# Patient Record
Sex: MALE | Race: WHITE | Employment: PART TIME | URBAN - NONMETROPOLITAN AREA
[De-identification: names, ages, dates, MRNs, and addresses within clinical notes are randomized per-mention and may not be internally consistent; named-entity substitution may affect disease eponyms.]

---

## 2018-08-07 ENCOUNTER — OFFICE VISIT (OUTPATIENT)
Dept: OPTOMETRY | Age: 53
End: 2018-08-07
Payer: COMMERCIAL

## 2018-08-07 DIAGNOSIS — H53.8 BLURRED VISION, BILATERAL: Primary | ICD-10-CM

## 2018-08-07 DIAGNOSIS — H52.203 MYOPIA OF BOTH EYES WITH ASTIGMATISM AND PRESBYOPIA: ICD-10-CM

## 2018-08-07 DIAGNOSIS — H52.13 MYOPIA OF BOTH EYES WITH ASTIGMATISM AND PRESBYOPIA: ICD-10-CM

## 2018-08-07 DIAGNOSIS — H52.4 MYOPIA OF BOTH EYES WITH ASTIGMATISM AND PRESBYOPIA: ICD-10-CM

## 2018-08-07 DIAGNOSIS — H25.13 NUCLEAR SCLEROSIS OF BOTH EYES: ICD-10-CM

## 2018-08-07 PROCEDURE — 1036F TOBACCO NON-USER: CPT | Performed by: OPTOMETRIST

## 2018-08-07 PROCEDURE — G8427 DOCREV CUR MEDS BY ELIG CLIN: HCPCS | Performed by: OPTOMETRIST

## 2018-08-07 PROCEDURE — G8421 BMI NOT CALCULATED: HCPCS | Performed by: OPTOMETRIST

## 2018-08-07 PROCEDURE — 3017F COLORECTAL CA SCREEN DOC REV: CPT | Performed by: OPTOMETRIST

## 2018-08-07 PROCEDURE — 99203 OFFICE O/P NEW LOW 30 MIN: CPT | Performed by: OPTOMETRIST

## 2018-08-07 ASSESSMENT — KERATOMETRY
OS_AXISANGLE_DEGREES: 083
OD_K1POWER_DIOPTERS: 47.00
OS_AXISANGLE2_DEGREES: 173
OD_K2POWER_DIOPTERS: 48.75
OD_AXISANGLE_DEGREES: 102
OD_AXISANGLE2_DEGREES: 012
OS_K2POWER_DIOPTERS: 48.50
OS_K1POWER_DIOPTERS: 47.25

## 2018-08-07 ASSESSMENT — REFRACTION_WEARINGRX
SPECS_TYPE: SVL
OS_CYLINDER: -1.25
OS_AXIS: 179
OD_SPHERE: -3.25
OD_CYLINDER: -1.00
OD_AXIS: 010
OS_SPHERE: -4.28

## 2018-08-07 ASSESSMENT — REFRACTION_MANIFEST
OD_AXIS: 010
OS_ADD: +2.25
OD_CYLINDER: -0.75
OD_SPHERE: -3.25
OS_SPHERE: -4.00
OS_CYLINDER: -0.75
OD_ADD: +2.25
OS_AXIS: 006

## 2018-08-07 ASSESSMENT — VISUAL ACUITY
CORRECTION_TYPE: GLASSES
OD_CC: 20/25 OU
OS_CC: 20/20
OD_CC+: -1
METHOD: SNELLEN - LINEAR

## 2018-08-07 ASSESSMENT — SLIT LAMP EXAM - LIDS
COMMENTS: NORMAL
COMMENTS: NORMAL

## 2018-08-07 ASSESSMENT — TONOMETRY
OD_IOP_MMHG: 19
OS_IOP_MMHG: 17
IOP_METHOD: NON-CONTACT AIR PUFF

## 2018-08-07 NOTE — PROGRESS NOTES
Final Rx       Sphere Cylinder Axis Add    Right -3.25 -0.75 010 +2.25    Left -4.00 -0.75 006 +2.25    Type:  SVL    Expiration Date:  8/7/2020            1. Blurred vision, bilateral    2. Nuclear sclerosis of both eyes    3.  Myopia of both eyes with astigmatism and presbyopia           Patient Instructions   New glasses recommended      Return in about 2 years (around 8/7/2020) for complete eye exam.

## 2021-01-28 ENCOUNTER — OFFICE VISIT (OUTPATIENT)
Dept: OPTOMETRY | Age: 56
End: 2021-01-28
Payer: MEDICARE

## 2021-01-28 DIAGNOSIS — H35.033 HYPERTENSIVE RETINOPATHY OF BOTH EYES: ICD-10-CM

## 2021-01-28 DIAGNOSIS — H52.203 MYOPIA OF BOTH EYES WITH ASTIGMATISM AND PRESBYOPIA: ICD-10-CM

## 2021-01-28 DIAGNOSIS — H53.8 BLURRED VISION, BILATERAL: Primary | ICD-10-CM

## 2021-01-28 DIAGNOSIS — H52.13 MYOPIA OF BOTH EYES WITH ASTIGMATISM AND PRESBYOPIA: ICD-10-CM

## 2021-01-28 DIAGNOSIS — H52.4 MYOPIA OF BOTH EYES WITH ASTIGMATISM AND PRESBYOPIA: ICD-10-CM

## 2021-01-28 PROCEDURE — 3017F COLORECTAL CA SCREEN DOC REV: CPT | Performed by: OPTOMETRIST

## 2021-01-28 PROCEDURE — 92250 FUNDUS PHOTOGRAPHY W/I&R: CPT | Performed by: OPTOMETRIST

## 2021-01-28 PROCEDURE — 99211 OFF/OP EST MAY X REQ PHY/QHP: CPT

## 2021-01-28 PROCEDURE — G8421 BMI NOT CALCULATED: HCPCS | Performed by: OPTOMETRIST

## 2021-01-28 PROCEDURE — 1036F TOBACCO NON-USER: CPT | Performed by: OPTOMETRIST

## 2021-01-28 PROCEDURE — 99214 OFFICE O/P EST MOD 30 MIN: CPT | Performed by: OPTOMETRIST

## 2021-01-28 PROCEDURE — G8484 FLU IMMUNIZE NO ADMIN: HCPCS | Performed by: OPTOMETRIST

## 2021-01-28 PROCEDURE — G8427 DOCREV CUR MEDS BY ELIG CLIN: HCPCS | Performed by: OPTOMETRIST

## 2021-01-28 ASSESSMENT — REFRACTION_MANIFEST
OD_SPHERE: -2.75
OD_AXIS: 010
OS_ADD: +2.25
OS_CYLINDER: -0.75
OD_ADD: +2.25
OS_SPHERE: -3.50
OS_AXIS: 010
OD_CYLINDER: -0.50

## 2021-01-28 ASSESSMENT — ENCOUNTER SYMPTOMS
ALLERGIC/IMMUNOLOGIC NEGATIVE: 0
EYES NEGATIVE: 0
RESPIRATORY NEGATIVE: 1
GASTROINTESTINAL NEGATIVE: 0

## 2021-01-28 ASSESSMENT — TONOMETRY
OD_IOP_MMHG: 17
IOP_METHOD: NON-CONTACT AIR PUFF
OS_IOP_MMHG: 15

## 2021-01-28 ASSESSMENT — REFRACTION_WEARINGRX
OS_CYLINDER: -0.75
OS_SPHERE: -4.00
OD_SPHERE: -3.25
OD_AXIS: 010
OS_ADD: +2.25
OD_ADD: +2.25
OS_AXIS: 006
OD_CYLINDER: -0.75

## 2021-01-28 ASSESSMENT — SLIT LAMP EXAM - LIDS
COMMENTS: NORMAL
COMMENTS: NORMAL

## 2021-01-28 ASSESSMENT — VISUAL ACUITY
OS_CC: 20/60
CORRECTION_TYPE: GLASSES
METHOD: SNELLEN - LINEAR
OD_SC: 20/20 OU

## 2021-01-28 NOTE — PROGRESS NOTES
Milla Thakur presents today for   Chief Complaint   Patient presents with    Blurred Vision    Vision Exam   .    HPI     Blurred Vision     In both eyes. Vision is blurred. Context:  distance vision and reading. Comments     Last Vision Exam: 8/7/2018 Aw  Last Ophthalmology Exam: n/a  Last Filled Glasses Rx: 8/7/2018  Insurance: Medicare  Update: Glasses  Distance and reading are getting more blurry                 Current Outpatient Medications   Medication Sig Dispense Refill    acetaminophen (TYLENOL) 325 MG tablet Take 650 mg by mouth as needed for Pain.  Fexofenadine HCl (ALLEGRA PO) Take 1 tablet by mouth as needed. No current facility-administered medications for this visit.           Family History   Problem Relation Age of Onset    Glaucoma Neg Hx     Diabetes Neg Hx     Cataracts Neg Hx      Social History     Socioeconomic History    Marital status: Single     Spouse name: None    Number of children: None    Years of education: None    Highest education level: None   Occupational History    None   Social Needs    Financial resource strain: None    Food insecurity     Worry: None     Inability: None    Transportation needs     Medical: None     Non-medical: None   Tobacco Use    Smoking status: Never Smoker    Smokeless tobacco: Never Used   Substance and Sexual Activity    Alcohol use: None    Drug use: None    Sexual activity: None   Lifestyle    Physical activity     Days per week: None     Minutes per session: None    Stress: None   Relationships    Social connections     Talks on phone: None     Gets together: None     Attends Nondenominational service: None     Active member of club or organization: None     Attends meetings of clubs or organizations: None     Relationship status: None    Intimate partner violence     Fear of current or ex partner: None     Emotionally abused: None     Physically abused: None     Forced sexual activity: None Other Topics Concern    None   Social History Narrative    None     History reviewed. No pertinent past medical history.     ROS     Positive for: Cardiovascular, Respiratory    Negative for: Constitutional, Gastrointestinal, Neurological, Skin, Genitourinary, Musculoskeletal, HENT, Endocrine, Eyes, Psychiatric, Allergic/Imm, Heme/Lymph          Main Ophthalmology Exam     External Exam       Right Left    External Normal Normal          Slit Lamp Exam       Right Left    Lids/Lashes Normal Normal    Conjunctiva/Sclera White and quiet White and quiet    Cornea Clear Clear    Anterior Chamber Deep and quiet Deep and quiet    Iris Round and reactive Round and reactive    Lens Trace Nuclear sclerosis Trace Nuclear sclerosis    Vitreous Normal Normal          Fundus Exam       Right Left    Disc Normal Normal    C/D Ratio 0.15 0.15    Macula Normal Normal    Vessels Tortuous, Vascular attenuation Tortuous, Vascular attenuation                   Tonometry     Tonometry (Non-contact air puff, 2:50 PM)       Right Left    Pressure 17 15   IOPg:  15.1             20.8  CH:  9.0          15.5  WS: 4.2          2.6                  Not recorded         Not recorded          Visual Acuity (Snellen - Linear)       Right Left    Dist cc 20/50 20/60    Near sc 20/20 OU     Correction: Glasses           Not recorded        Neuro/Psych     Neuro/Psych     Oriented x3: Yes    Mood/Affect: Normal               Not recorded            Ophthalmology Exam     Wearing Rx       Sphere Cylinder Axis Add    Right -3.25 -0.75 010 +2.25    Left -4.00 -0.75 006 +2.25    Age: 1yrs          Wearing Rx #2       Sphere Cylinder Axis Add    Right -3.25 -1.00 010     Left -4.28 -1.25 179     Age: 4yrs    Type: SVL              Manifest Refraction     Manifest Refraction       Sphere Cylinder Axis Dist VA Add    Right -2.75 -0.50 010 20/25 +2.25    Left -3.50 -0.75 010 20/25 +2.25          Manifest Refraction #2 (Auto) Sphere Cylinder Axis Dist VA Add    Right -2.25 -0.50 005      Left -2.75 -0.75 011                 Final Rx       Sphere Cylinder Axis Add    Right -2.75 -0.50 010 +2.25    Left -3.50 -0.75 010 +2.25    Expiration Date: 1/29/2023            1. Blurred vision, bilateral    2. Myopia of both eyes with astigmatism and presbyopia    3.  Hypertensive retinopathy of both eyes           Patient Instructions   New glasses recommended      Return in about 2 years (around 1/28/2023) for complete eye exam.